# Patient Record
Sex: FEMALE | Race: ASIAN | NOT HISPANIC OR LATINO | URBAN - METROPOLITAN AREA
[De-identification: names, ages, dates, MRNs, and addresses within clinical notes are randomized per-mention and may not be internally consistent; named-entity substitution may affect disease eponyms.]

---

## 2021-03-30 ENCOUNTER — EMERGENCY (EMERGENCY)
Facility: HOSPITAL | Age: 46
LOS: 1 days | Discharge: HOME | End: 2021-03-30
Attending: EMERGENCY MEDICINE
Payer: MEDICAID

## 2021-03-30 VITALS
OXYGEN SATURATION: 98 % | HEART RATE: 70 BPM | DIASTOLIC BLOOD PRESSURE: 80 MMHG | SYSTOLIC BLOOD PRESSURE: 104 MMHG | TEMPERATURE: 98 F | RESPIRATION RATE: 20 BRPM

## 2021-03-30 VITALS
DIASTOLIC BLOOD PRESSURE: 71 MMHG | HEIGHT: 67 IN | WEIGHT: 149.91 LBS | SYSTOLIC BLOOD PRESSURE: 113 MMHG | TEMPERATURE: 98 F | HEART RATE: 81 BPM | RESPIRATION RATE: 20 BRPM

## 2021-03-30 LAB
ALBUMIN SERPL ELPH-MCNC: 4.4 G/DL — SIGNIFICANT CHANGE UP (ref 3.3–5)
ALBUMIN SERPL ELPH-MCNC: 4.8 G/DL — SIGNIFICANT CHANGE UP (ref 3.3–5)
ALP SERPL-CCNC: 39 U/L — LOW (ref 40–120)
ALP SERPL-CCNC: 44 U/L — SIGNIFICANT CHANGE UP (ref 40–120)
ALT FLD-CCNC: 12 U/L — SIGNIFICANT CHANGE UP (ref 10–45)
ALT FLD-CCNC: 14 U/L — SIGNIFICANT CHANGE UP (ref 10–45)
ANION GAP SERPL CALC-SCNC: 11 MMOL/L — SIGNIFICANT CHANGE UP (ref 5–17)
ANION GAP SERPL CALC-SCNC: 19 MMOL/L — HIGH (ref 5–17)
APPEARANCE UR: CLEAR — SIGNIFICANT CHANGE UP
APTT BLD: 26.7 SEC — LOW (ref 27.5–35.5)
AST SERPL-CCNC: 16 U/L — SIGNIFICANT CHANGE UP (ref 10–40)
AST SERPL-CCNC: 20 U/L — SIGNIFICANT CHANGE UP (ref 10–40)
BACTERIA # UR AUTO: NEGATIVE — SIGNIFICANT CHANGE UP
BASE EXCESS BLDV CALC-SCNC: -9.3 MMOL/L — LOW (ref -2–2)
BASOPHILS # BLD AUTO: 0.03 K/UL — SIGNIFICANT CHANGE UP (ref 0–0.2)
BASOPHILS NFR BLD AUTO: 0.8 % — SIGNIFICANT CHANGE UP (ref 0–2)
BILIRUB SERPL-MCNC: 0.3 MG/DL — SIGNIFICANT CHANGE UP (ref 0.2–1.2)
BILIRUB SERPL-MCNC: 0.4 MG/DL — SIGNIFICANT CHANGE UP (ref 0.2–1.2)
BILIRUB UR-MCNC: NEGATIVE — SIGNIFICANT CHANGE UP
BUN SERPL-MCNC: 5 MG/DL — LOW (ref 7–23)
BUN SERPL-MCNC: 5 MG/DL — LOW (ref 7–23)
CA-I SERPL-SCNC: 1.18 MMOL/L — SIGNIFICANT CHANGE UP (ref 1.12–1.3)
CALCIUM SERPL-MCNC: 10 MG/DL — SIGNIFICANT CHANGE UP (ref 8.4–10.5)
CALCIUM SERPL-MCNC: 9.2 MG/DL — SIGNIFICANT CHANGE UP (ref 8.4–10.5)
CHLORIDE BLDV-SCNC: 107 MMOL/L — SIGNIFICANT CHANGE UP (ref 96–108)
CHLORIDE SERPL-SCNC: 105 MMOL/L — SIGNIFICANT CHANGE UP (ref 96–108)
CHLORIDE SERPL-SCNC: 107 MMOL/L — SIGNIFICANT CHANGE UP (ref 96–108)
CO2 BLDV-SCNC: 18 MMOL/L — LOW (ref 22–30)
CO2 SERPL-SCNC: 17 MMOL/L — LOW (ref 22–31)
CO2 SERPL-SCNC: 20 MMOL/L — LOW (ref 22–31)
COLOR SPEC: COLORLESS — SIGNIFICANT CHANGE UP
CREAT SERPL-MCNC: 0.54 MG/DL — SIGNIFICANT CHANGE UP (ref 0.5–1.3)
CREAT SERPL-MCNC: 0.6 MG/DL — SIGNIFICANT CHANGE UP (ref 0.5–1.3)
DIFF PNL FLD: NEGATIVE — SIGNIFICANT CHANGE UP
EOSINOPHIL # BLD AUTO: 0.08 K/UL — SIGNIFICANT CHANGE UP (ref 0–0.5)
EOSINOPHIL NFR BLD AUTO: 2.2 % — SIGNIFICANT CHANGE UP (ref 0–6)
EPI CELLS # UR: 5 /HPF — SIGNIFICANT CHANGE UP
GAS PNL BLDV: 135 MMOL/L — SIGNIFICANT CHANGE UP (ref 135–145)
GAS PNL BLDV: SIGNIFICANT CHANGE UP
GLUCOSE BLDV-MCNC: 101 MG/DL — HIGH (ref 70–99)
GLUCOSE SERPL-MCNC: 102 MG/DL — HIGH (ref 70–99)
GLUCOSE SERPL-MCNC: 104 MG/DL — HIGH (ref 70–99)
GLUCOSE UR QL: NEGATIVE — SIGNIFICANT CHANGE UP
HCO3 BLDV-SCNC: 17 MMOL/L — LOW (ref 21–29)
HCT VFR BLD CALC: 30.2 % — LOW (ref 34.5–45)
HCT VFR BLDA CALC: 30 % — LOW (ref 39–50)
HGB BLD CALC-MCNC: 9.6 G/DL — LOW (ref 11.5–15.5)
HGB BLD-MCNC: 9.1 G/DL — LOW (ref 11.5–15.5)
HYALINE CASTS # UR AUTO: 1 /LPF — SIGNIFICANT CHANGE UP (ref 0–2)
IMM GRANULOCYTES NFR BLD AUTO: 0.3 % — SIGNIFICANT CHANGE UP (ref 0–1.5)
INR BLD: 1.13 RATIO — SIGNIFICANT CHANGE UP (ref 0.88–1.16)
KETONES UR-MCNC: SIGNIFICANT CHANGE UP
LACTATE BLDV-MCNC: 1 MMOL/L — SIGNIFICANT CHANGE UP (ref 0.7–2)
LEUKOCYTE ESTERASE UR-ACNC: NEGATIVE — SIGNIFICANT CHANGE UP
LYMPHOCYTES # BLD AUTO: 1.37 K/UL — SIGNIFICANT CHANGE UP (ref 1–3.3)
LYMPHOCYTES # BLD AUTO: 36.8 % — SIGNIFICANT CHANGE UP (ref 13–44)
MCHC RBC-ENTMCNC: 25.1 PG — LOW (ref 27–34)
MCHC RBC-ENTMCNC: 30.1 GM/DL — LOW (ref 32–36)
MCV RBC AUTO: 83.4 FL — SIGNIFICANT CHANGE UP (ref 80–100)
MONOCYTES # BLD AUTO: 0.34 K/UL — SIGNIFICANT CHANGE UP (ref 0–0.9)
MONOCYTES NFR BLD AUTO: 9.1 % — SIGNIFICANT CHANGE UP (ref 2–14)
NEUTROPHILS # BLD AUTO: 1.89 K/UL — SIGNIFICANT CHANGE UP (ref 1.8–7.4)
NEUTROPHILS NFR BLD AUTO: 50.8 % — SIGNIFICANT CHANGE UP (ref 43–77)
NITRITE UR-MCNC: NEGATIVE — SIGNIFICANT CHANGE UP
NRBC # BLD: 0 /100 WBCS — SIGNIFICANT CHANGE UP (ref 0–0)
OTHER CELLS CSF MANUAL: 9 ML/DL — LOW (ref 18–22)
PCO2 BLDV: 39 MMHG — SIGNIFICANT CHANGE UP (ref 39–42)
PH BLDV: 7.26 — LOW (ref 7.35–7.45)
PH UR: 6.5 — SIGNIFICANT CHANGE UP (ref 5–8)
PLATELET # BLD AUTO: 239 K/UL — SIGNIFICANT CHANGE UP (ref 150–400)
PO2 BLDV: 39 MMHG — SIGNIFICANT CHANGE UP (ref 25–45)
POTASSIUM BLDV-SCNC: 3.8 MMOL/L — SIGNIFICANT CHANGE UP (ref 3.5–5.3)
POTASSIUM SERPL-MCNC: 4 MMOL/L — SIGNIFICANT CHANGE UP (ref 3.5–5.3)
POTASSIUM SERPL-MCNC: 4.7 MMOL/L — SIGNIFICANT CHANGE UP (ref 3.5–5.3)
POTASSIUM SERPL-SCNC: 4 MMOL/L — SIGNIFICANT CHANGE UP (ref 3.5–5.3)
POTASSIUM SERPL-SCNC: 4.7 MMOL/L — SIGNIFICANT CHANGE UP (ref 3.5–5.3)
PROT SERPL-MCNC: 7.2 G/DL — SIGNIFICANT CHANGE UP (ref 6–8.3)
PROT SERPL-MCNC: 7.9 G/DL — SIGNIFICANT CHANGE UP (ref 6–8.3)
PROT UR-MCNC: NEGATIVE — SIGNIFICANT CHANGE UP
PROTHROM AB SERPL-ACNC: 13.5 SEC — SIGNIFICANT CHANGE UP (ref 10.6–13.6)
RBC # BLD: 3.62 M/UL — LOW (ref 3.8–5.2)
RBC # FLD: 14.3 % — SIGNIFICANT CHANGE UP (ref 10.3–14.5)
RBC CASTS # UR COMP ASSIST: 3 /HPF — SIGNIFICANT CHANGE UP (ref 0–4)
SAO2 % BLDV: 68 % — SIGNIFICANT CHANGE UP (ref 67–88)
SODIUM SERPL-SCNC: 136 MMOL/L — SIGNIFICANT CHANGE UP (ref 135–145)
SODIUM SERPL-SCNC: 143 MMOL/L — SIGNIFICANT CHANGE UP (ref 135–145)
SP GR SPEC: 1.01 — LOW (ref 1.01–1.02)
UROBILINOGEN FLD QL: NEGATIVE — SIGNIFICANT CHANGE UP
WBC # BLD: 3.72 K/UL — LOW (ref 3.8–10.5)
WBC # FLD AUTO: 3.72 K/UL — LOW (ref 3.8–10.5)
WBC UR QL: 1 /HPF — SIGNIFICANT CHANGE UP (ref 0–5)

## 2021-03-30 PROCEDURE — 82330 ASSAY OF CALCIUM: CPT

## 2021-03-30 PROCEDURE — 96360 HYDRATION IV INFUSION INIT: CPT

## 2021-03-30 PROCEDURE — 84295 ASSAY OF SERUM SODIUM: CPT

## 2021-03-30 PROCEDURE — 82947 ASSAY GLUCOSE BLOOD QUANT: CPT

## 2021-03-30 PROCEDURE — 85610 PROTHROMBIN TIME: CPT

## 2021-03-30 PROCEDURE — 80053 COMPREHEN METABOLIC PANEL: CPT

## 2021-03-30 PROCEDURE — 93005 ELECTROCARDIOGRAM TRACING: CPT

## 2021-03-30 PROCEDURE — 85018 HEMOGLOBIN: CPT

## 2021-03-30 PROCEDURE — 82435 ASSAY OF BLOOD CHLORIDE: CPT

## 2021-03-30 PROCEDURE — 82962 GLUCOSE BLOOD TEST: CPT

## 2021-03-30 PROCEDURE — 84132 ASSAY OF SERUM POTASSIUM: CPT

## 2021-03-30 PROCEDURE — 83605 ASSAY OF LACTIC ACID: CPT

## 2021-03-30 PROCEDURE — 82803 BLOOD GASES ANY COMBINATION: CPT

## 2021-03-30 PROCEDURE — 99285 EMERGENCY DEPT VISIT HI MDM: CPT | Mod: 25

## 2021-03-30 PROCEDURE — 85014 HEMATOCRIT: CPT

## 2021-03-30 PROCEDURE — 85730 THROMBOPLASTIN TIME PARTIAL: CPT

## 2021-03-30 PROCEDURE — 93010 ELECTROCARDIOGRAM REPORT: CPT

## 2021-03-30 PROCEDURE — 81001 URINALYSIS AUTO W/SCOPE: CPT

## 2021-03-30 PROCEDURE — 82010 KETONE BODYS QUAN: CPT

## 2021-03-30 PROCEDURE — 85025 COMPLETE CBC W/AUTO DIFF WBC: CPT

## 2021-03-30 PROCEDURE — 99284 EMERGENCY DEPT VISIT MOD MDM: CPT | Mod: 25

## 2021-03-30 RX ORDER — IBUPROFEN 200 MG
600 TABLET ORAL ONCE
Refills: 0 | Status: COMPLETED | OUTPATIENT
Start: 2021-03-30 | End: 2021-03-30

## 2021-03-30 RX ORDER — LIDOCAINE 4 G/100G
1 CREAM TOPICAL ONCE
Refills: 0 | Status: COMPLETED | OUTPATIENT
Start: 2021-03-30 | End: 2021-03-30

## 2021-03-30 RX ORDER — ACETAMINOPHEN 500 MG
975 TABLET ORAL ONCE
Refills: 0 | Status: COMPLETED | OUTPATIENT
Start: 2021-03-30 | End: 2021-03-30

## 2021-03-30 RX ORDER — SODIUM CHLORIDE 9 MG/ML
1000 INJECTION INTRAMUSCULAR; INTRAVENOUS; SUBCUTANEOUS ONCE
Refills: 0 | Status: COMPLETED | OUTPATIENT
Start: 2021-03-30 | End: 2021-03-30

## 2021-03-30 RX ADMIN — Medication 975 MILLIGRAM(S): at 13:35

## 2021-03-30 RX ADMIN — Medication 600 MILLIGRAM(S): at 14:30

## 2021-03-30 RX ADMIN — Medication 600 MILLIGRAM(S): at 13:35

## 2021-03-30 RX ADMIN — LIDOCAINE 1 APPLICATION(S): 4 CREAM TOPICAL at 08:20

## 2021-03-30 RX ADMIN — Medication 975 MILLIGRAM(S): at 14:30

## 2021-03-30 RX ADMIN — SODIUM CHLORIDE 1000 MILLILITER(S): 9 INJECTION INTRAMUSCULAR; INTRAVENOUS; SUBCUTANEOUS at 11:59

## 2021-03-30 RX ADMIN — SODIUM CHLORIDE 1000 MILLILITER(S): 9 INJECTION INTRAMUSCULAR; INTRAVENOUS; SUBCUTANEOUS at 13:35

## 2021-03-30 NOTE — ED ADULT NURSE NOTE - OBJECTIVE STATEMENT
44 y/o female c/o rectal pain and bleeding with BM x 1 month.  Pt states that she is usually constipated with pain and she noticed small amount of blood only with BM.   Pt states her last BM was yesterday, but prior to that it was 6 days ago.   Pt reports taking senna tea to help with BM.  Pt has not seen a doctor for this.  Pt denies fever, chills, chest pain, SOB, n/v/d, lightheadedness.  Rectal exam done by Dr. Vargas,   (please refer to MD's notes).  No acute respiratory distress noted.  Respiration easy and unlabored.  Extremities mobile. 46 y/o female c/o rectal pain and bleeding with BM x 1 month.  Pt states that she is usually constipated with pain and she noticed small amount of blood only with BM.   Pt states her last BM was yesterday, but prior to that it was 6 days ago.   Pt reports taking senna tea to help with BM.  Pt has not seen a doctor for this.  Pt denies fever, chills, chest pain, SOB, n/v/d, lightheadedness.  Rectal exam done by Dr. Vargas,  (please refer to MD's notes).  No acute respiratory distress noted.  Respiration easy and unlabored.  Extremities mobile.

## 2021-03-30 NOTE — ED PROVIDER NOTE - PHYSICAL EXAMINATION
GENERAL: Patient awake alert NAD.  HEENT: NC/AT.  LUNGS: CTAB, no wheezes or crackles.  CARDIAC: RRR, no m/r/g.  ABDOMEN: Soft, NT, ND, No rebound, guarding. No CVA tenderness.  : Rectal exham performed w/ Rozina Rakha, RN.  No external or internal hemorrhoid visualized/palpated, soft stool, significant tenderness during rectal exam. No blood.  MSK: Noo pain with movement, no deformities.  NEURO: A&Ox3. Moving all extremities.  SKIN: Warm and dry. No rash.  PSYCH: Normal affect.

## 2021-03-30 NOTE — ED PROVIDER NOTE - NSFOLLOWUPINSTRUCTIONS_ED_ALL_ED_FT
You were seen for rectal pain and bleeding.  This is likely due to either an internal hemorrhoid or anal fissure.    Do sitz baths 2-3 times a day for this.  I am also attaching a GI and surgery information for you.  Please call to make an appointment and follow up.    If you have any concerning symptoms, seek immediate medical attention. You were seen for rectal pain and bleeding.  This is likely due to either an internal hemorrhoid or anal fissure.    Do sitz baths 2-3 times a day for this.  I am also attaching a GI and surgery information for you.  Please call to make an appointment and follow up.    Follow up with your PCP within one week and bring a copy of your results.    If you have any concerning symptoms, seek immediate medical attention.

## 2021-03-30 NOTE — ED PROVIDER NOTE - PATIENT PORTAL LINK FT
You can access the FollowMyHealth Patient Portal offered by [facility] by registering at the following website: [email or web address]. By joining [employer]’s FollowMyHealth portal, you will also be able to view your health information using other applications (apps) compatible with our system. You can access the FollowMyHealth Patient Portal offered by [facility] by registering at the following website: [email or web address]. By joining [employer]’s FollowMyHealth portal, you will also be able to view your health information using other applications (apps) compatible with our system.

## 2021-03-30 NOTE — ED ADULT NURSE NOTE - CAS EDP DISCH TYPE
Pt discharged by break coverage nurse Augustine Bahena/Brijesh Pt discharged by  nurse Augustine Bahena/Brijesh

## 2021-03-30 NOTE — ED ADULT NURSE REASSESSMENT NOTE - NS ED NURSE REASSESS COMMENT FT1
Note for 11:30am:  Pt was for discharge home as per MD.  Pt was in the hallway being discharged, pt felt dizzy with LOC and was falling toward the floor but was caught by one of the techs.  Pt did not fall on the floor, witnessed by ousmane, RN. MD.   Pt. was pale and diaphoretic.  Blood glucose 99.  Dr Vargas, examined pt and spoke to pt's son on the phone who states pt. synopsizes after seeing blood.  Pt. states she felt strange when the IV was being pulled out but denies seeing any blood.  Pt states she feels better now.  No acute respiratory distress noted, v/s obtained.  20guage IV lock was inserted to rt arm.  IV fluids given as per MD.

## 2021-03-30 NOTE — ED PROVIDER NOTE - NS ED ROS FT
General: denies fever, chills, weight loss/weight gain.  HENT: denies nasal congestion, sore throat, rhinorrhea, ear pain.  Eyes: denies visual changes, blurred vision, eye discharge, eye redness.  Neck: denies neck pain, neck swelling.  CV: denies chest pain, palpitations.  Resp: denies difficulty breathing, cough.  Abdominal: denies nausea, vomiting, diarrhea, abdominal pain, blood in stool, dark stool.  : rectal pain and bleeding.  MSK: denies muscle aches, bony pain, leg pain, leg swelling  Neuro: denies headaches, numbness, tingling, dizziness, lightheadedness.  Skin: denies rashes, cuts, bruises.  Hematologic: denies unexplained bruises.

## 2021-03-30 NOTE — ED PROVIDER NOTE - OBJECTIVE STATEMENT
45F w/ no PMHx p/w rectal bleeding and pain w/ BM for one month.  States she's usually constipated and notices pain and small amounts of bleed only when having a BM. 45F w/ no PMHx p/w rectal bleeding and pain w/ BM for one month.  States she's usually constipated and notices pain and small amounts of bleed only when having a BM.  Last BM yesterday, but prior to that was 6 days ago.  Has been taking senna tea to aid w/ BM.  Denies any lightheadedness, CP, SOB.  Has not tried taking any meds or seen a doctor for this.

## 2021-03-30 NOTE — ED PROVIDER NOTE - NSFOLLOWUPCLINICS_GEN_ALL_ED_FT
[facility]  General Surgery  [address] [facility] [address] [facility] - [address] [place] [address]  Phone: [phone]  Fax:   Follow Up Time: 7-10 Days    Gastroenterology at [facility]  Gastroenterology  [address] [place] [address]  Phone: [phone]  Fax:   Follow Up Time: 7-10 Days    [facility] at [place]  Gastroenterology  [address] [place] [address]  Phone: [phone]  Fax:   Follow Up Time: 7-10 Days

## 2021-03-30 NOTE — ED PROVIDER NOTE - PROGRESS NOTE DETAILS
h/h - 9/30 - will need close interval fu with gi and urgent colonoscopy Sam PGY2 - discussed results w/ pt, who understands them.  Will dc w/ surgery and GI f/u.  Will advise to do sitz baths.  All other questions and concerns have been addressed.  Pt. will be dc/d. Sam PGY2 - Pt. was in the hallway while being discharged and felt dizzy.  Had an episode of LOC and fell toward the floor but was caught by one of the techs.  FS 99, VSS, no hypotension.  Pt. was pale and diaphoretic.  No FNDs, neuro exam unremarkable.  Spoke w/ son on the phone who states pt. syncopizes after seeing blood.  Pt. states she felt strange when the IV was being pulled out but denies seeing any blood.  States she feels better now.  Will obtain CMP, coags, EKG, administer fluids, place on monitor.  Will reassess. Sam PGY2 - pH 7.26, bicarb 17, AG 19, .  Pt. does not have a hx of DM and is not on SGLT2 inhibitors.  Will repeat CMP and add on beta hydroxy butyrate.  Will obtain UA as well to assess for urinary ketones. Sam PGY2 - Called lab x2 an hour ago and just now.  First told me beta hydroxy would result in 20 mins and now states it will result in another 10 mins. Sam PGY2 - Pt. feels improved.  CMP improved.  Labs do not show evidence of DKA.  Will dc w/ PCP f/u and strict return precautions.  All other questions and concerns have been addressed.  Pt. will be dc/d.

## 2021-03-30 NOTE — ED PROVIDER NOTE - CLINICAL SUMMARY MEDICAL DECISION MAKING FREE TEXT BOX
bam - 45 f with ho constipation - with 1 month of rectal bleeding on tp with bm only - no abd pain interittently days w/o stooling -- co rectal pain abd soft nt - no prev colonoscopy - on resident exam no internal hemorrhoid palpated no mass soft stool, on ext exam no fissure no ext hemmhorroid -- check - h/h pt hemodynamically stable if hgb nml need gi fu for outpt colonoscopy - consider viscous lidocaine for rectal pain bam - 45 f with ho constipation - with 1 month of rectal bleeding on tp with bm only - no abd pain intermittently days w/o stooling -- co rectal pain abd soft nt - no prev colonoscopy - on resident exam no internal hemorrhoid palpated no mass soft stool, on ext exam no fissure no ext hemorrhoid -- check - h/h pt hemodynamically stable if hgb nml need gi fu for outpt colonoscopy - consider viscous lidocaine for rectal pain

## 2021-03-30 NOTE — ED ADULT NURSE NOTE - NSIMPLEMENTINTERV_GEN_ALL_ED
Implemented All Universal Safety Interventions:  [place] to call system. Call bell, personal items and telephone within reach. Instruct patient to call for assistance. Room bathroom lighting operational. Non-slip footwear when patient is off stretcher. Physically safe environment: no spills, clutter or unnecessary equipment. Stretcher in lowest position, wheels locked, appropriate side rails in place.

## 2021-03-30 NOTE — ED PROVIDER NOTE - NSFOLLOWUPCLINICSTOKEN_GEN_ALL_ED_FT
126374:7-10 Days|| ||00\01||False;172304:7-10 Days|| ||00\01||False;657868:7-10 Days|| ||00\01||False;

## 2021-04-12 PROBLEM — Z78.9 OTHER SPECIFIED HEALTH STATUS: Chronic | Status: ACTIVE | Noted: 2021-03-30

## 2021-04-13 PROBLEM — Z00.00 ENCOUNTER FOR PREVENTIVE HEALTH EXAMINATION: Status: ACTIVE | Noted: 2021-04-13

## 2021-04-14 ENCOUNTER — APPOINTMENT (OUTPATIENT)
Dept: COLORECTAL SURGERY | Facility: CLINIC | Age: 46
End: 2021-04-14
Payer: MEDICAID

## 2021-04-14 VITALS
RESPIRATION RATE: 15 BRPM | BODY MASS INDEX: 23.54 KG/M2 | HEART RATE: 88 BPM | WEIGHT: 150 LBS | SYSTOLIC BLOOD PRESSURE: 92 MMHG | OXYGEN SATURATION: 98 % | DIASTOLIC BLOOD PRESSURE: 62 MMHG | HEIGHT: 67 IN | TEMPERATURE: 98.1 F

## 2021-04-14 PROCEDURE — 99244 OFF/OP CNSLTJ NEW/EST MOD 40: CPT | Mod: 25

## 2021-04-14 PROCEDURE — 46221 LIGATION OF HEMORRHOID(S): CPT

## 2021-04-14 NOTE — ASSESSMENT
[FreeTextEntry1] : Ms. Fernando presents to the office for consultation regarding bleeding and inflamed internal hemorrhoids.  She recently had a negative colonoscopy, and as such, the bleeding is thought to originate from her hemorrhoidal columns.  In office today, we discussed the pathophysiology of internal hemorrhoidal bleeding as well as flares.  She was advised on a high-fiber diet with ample water intake as well as MiraLAX nightly.  NOEL and anoscopy were also performed and a rubber band was placed to the posterior quadrant to good effect.  She and her son were advised on what to expect post procedure and she can return to the office in 2 weeks time to undergo a repeat ligation.  She had serial rubber band ligations fail to address the bleeding, she can return to the office for discussion of surgical hemorrhoidectomy.

## 2021-04-14 NOTE — HISTORY OF PRESENT ILLNESS
[FreeTextEntry1] : Ms. Fernando presents to the office, accompanied by her son for translation purposes.  She reports a history of hemorrhoidal burning as well as rectal bleeding that is chronic in nature.  She states that her bowel movements are passed on a daily basis but often with difficulty and with straining.  The rectal bleeding has been increasing in frequency over time.  There is also associated hemorrhoidal burning but no itching. Hemorrhoidal burning worsens when she uses hydrocortisone cream.  She had a colonoscopy completed approximately 1 week earlier with findings only of hemorrhoids.

## 2021-04-14 NOTE — CONSULT LETTER
[Dear  ___] : Dear  [unfilled], [Consult Letter:] : I had the pleasure of evaluating your patient, [unfilled]. [Please see my note below.] : Please see my note below. [Consult Closing:] : Thank you very much for allowing me to participate in the care of this patient.  If you have any questions, please do not hesitate to contact me. [Sincerely,] : Sincerely, [FreeTextEntry3] : Irma Yoder MD\par

## 2021-04-14 NOTE — PHYSICAL EXAM
[Normal rectal exam] : exam was normal [Reduce Spontaneously] : a spontaneously reducible (grade II) [Normal] : was normal [None] : there was no rectal mass  [No Rash or Lesion] : No rash or lesion [Alert] : alert [Oriented to Person] : oriented to person [Oriented to Place] : oriented to place [Oriented to Time] : oriented to time [Calm] : calm [Skin Tags] : there were no residual hemorrhoidal skin tags seen [Gross Blood] : no gross blood [de-identified] : No apparent distress [de-identified] : Normocephalic atraumatic [de-identified] : Moving all extremities x4

## 2021-04-20 ENCOUNTER — NON-APPOINTMENT (OUTPATIENT)
Age: 46
End: 2021-04-20

## 2021-04-20 ENCOUNTER — APPOINTMENT (OUTPATIENT)
Dept: COLORECTAL SURGERY | Facility: CLINIC | Age: 46
End: 2021-04-20
Payer: MEDICAID

## 2021-04-20 VITALS
WEIGHT: 150 LBS | BODY MASS INDEX: 23.54 KG/M2 | RESPIRATION RATE: 15 BRPM | TEMPERATURE: 98 F | HEIGHT: 67 IN | SYSTOLIC BLOOD PRESSURE: 92 MMHG | HEART RATE: 81 BPM | DIASTOLIC BLOOD PRESSURE: 65 MMHG

## 2021-04-20 PROCEDURE — 99024 POSTOP FOLLOW-UP VISIT: CPT

## 2021-04-20 NOTE — PHYSICAL EXAM
[Normal rectal exam] : exam was normal [Reduce Spontaneously] : a spontaneously reducible (grade II) [Skin Tags] : there were no residual hemorrhoidal skin tags seen [Normal] : was normal [None] : there was no rectal mass  [Gross Blood] : no gross blood [No Rash or Lesion] : No rash or lesion [Alert] : alert [Oriented to Person] : oriented to person [Oriented to Place] : oriented to place [Oriented to Time] : oriented to time [Calm] : calm [de-identified] : No apparent distress [de-identified] : Normocephalic atraumatic [de-identified] : Moving all extremities x4

## 2021-04-20 NOTE — ASSESSMENT
[FreeTextEntry1] : Ms. Fernando presents to the office for consultation regarding bleeding and inflamed internal hemorrhoids.  She recently had a negative colonoscopy, and as such, the bleeding is thought to originate from her hemorrhoidal columns.  In office today, we discussed the pathophysiology of internal hemorrhoidal bleeding as well as flares.  She was advised on a high-fiber diet with ample water intake as well as MiraLAX nightly.  NOEL and anoscopy were also performed and a rubber band was placed to the posterior quadrant to good effect.  She and her son were advised on what to expect post procedure and she can return to the office in 2 weeks time to undergo a repeat ligation.  She had serial rubber band ligations fail to address the bleeding, she can return to the office for discussion of surgical hemorrhoidectomy.\par \par 4/20/21 Ms. Fernando returns to the office with reports of anorectal pain.  On further discussion, it appears that her symptoms are arising from pelvic floor spasms.  We reviewed the components of a healthy bowel regimen, and I have suggested incorporating Metamucil into her daily routine.  In order to break the muscle spasms, I will prescribe Valium which should only be taken at night to avoid excess somnolence.  I have also advised her to use it as needed to avoid reliance on the medication for continued relief.  Sitz bath should continue to be utilized for relaxation as well.  Follow-up in 2 weeks to reassess for improvement.
yes...

## 2021-04-20 NOTE — HISTORY OF PRESENT ILLNESS
[FreeTextEntry1] : Ms. Fernando presents to the office, accompanied by her son for translation purposes.  She reports a history of hemorrhoidal burning as well as rectal bleeding that is chronic in nature.  She states that her bowel movements are passed on a daily basis but often with difficulty and with straining.  The rectal bleeding has been increasing in frequency over time.  There is also associated hemorrhoidal burning but no itching. Hemorrhoidal burning worsens when she uses hydrocortisone cream.  She had a colonoscopy completed approximately 1 week earlier with findings only of hemorrhoids.\par \par 4/20/21 Ms. Fernando presents to the office for follow-up.  She is here with reports of anorectal pain.  She states that the pain began approximately 1 month earlier, radiates posteriorly as well as anteriorly and is present with sitting and worse when ambulating.  The pain is alleviated with ibuprofen as well as warm sitz baths.  Since her last office visit, she has been compliant with MiraLAX nightly as well as daily water requirement.  She admittedly eats mostly raw fruits and vegetables and does not eat much psyllium products.  She states that there is a rectocele that also hampers her ability to defecate properly despite MiraLAX.

## 2021-05-03 ENCOUNTER — APPOINTMENT (OUTPATIENT)
Dept: COLORECTAL SURGERY | Facility: CLINIC | Age: 46
End: 2021-05-03
Payer: MEDICAID

## 2021-05-03 VITALS
BODY MASS INDEX: 23.54 KG/M2 | HEIGHT: 67 IN | DIASTOLIC BLOOD PRESSURE: 74 MMHG | RESPIRATION RATE: 16 BRPM | HEART RATE: 106 BPM | SYSTOLIC BLOOD PRESSURE: 114 MMHG | WEIGHT: 150 LBS

## 2021-05-03 PROCEDURE — 99214 OFFICE O/P EST MOD 30 MIN: CPT

## 2021-05-03 PROCEDURE — 99072 ADDL SUPL MATRL&STAF TM PHE: CPT

## 2021-05-03 RX ORDER — HYDROCORTISONE 25 MG/G
2.5 CREAM TOPICAL
Refills: 0 | Status: COMPLETED | COMMUNITY
End: 2021-05-03

## 2021-05-03 RX ORDER — IBUPROFEN 400 MG/1
400 TABLET, FILM COATED ORAL
Qty: 30 | Refills: 0 | Status: ACTIVE | COMMUNITY
Start: 2021-04-13

## 2021-05-03 NOTE — ASSESSMENT
[FreeTextEntry1] : Ms. Fernando presents to the office for consultation regarding bleeding and inflamed internal hemorrhoids.  She recently had a negative colonoscopy, and as such, the bleeding is thought to originate from her hemorrhoidal columns.  In office today, we discussed the pathophysiology of internal hemorrhoidal bleeding as well as flares.  She was advised on a high-fiber diet with ample water intake as well as MiraLAX nightly.  NOEL and anoscopy were also performed and a rubber band was placed to the posterior quadrant to good effect.  She and her son were advised on what to expect post procedure and she can return to the office in 2 weeks time to undergo a repeat ligation.  She had serial rubber band ligations fail to address the bleeding, she can return to the office for discussion of surgical hemorrhoidectomy.\par \par 4/20/21 Ms. Fernando returns to the office with reports of anorectal pain.  On further discussion, it appears that her symptoms are arising from pelvic floor spasms.  We reviewed the components of a healthy bowel regimen, and I have suggested incorporating Metamucil into her daily routine.  In order to break the muscle spasms, I will prescribe Valium which should only be taken at night to avoid excess somnolence.  I have also advised her to use it as needed to avoid reliance on the medication for continued relief.  Sitz bath should continue to be utilized for relaxation as well.  Follow-up in 2 weeks to reassess for improvement.\par \par 5/3/21 Ms. Fernando returns to the office for follow-up.  Her pelvic pain and discomfort persist despite usage of p.o. Valium.  Based on the inability of Valium to alleviate her discomfort, pelvic floor spasms then may not be the cause of her discomfort.  I have recommended a CT of the pelvis to further elucidate the cause of her pain.  She also reports a known history of ? uterine prolapse.  I recommended she see a urogynecologist in the [facility] to further evaluate this as a potential cause of her pain and discomfort.  She requested scheduling for a hemorrhoidectomy, but based on NOEL/anoscopy from her consultation visit, there is minimal disease to treat, and undergoing hemorrhoid surgery certainly would not alleviate the pain that she is currently experiencing.  In addition, she denies hemorrhoidal prolapse or bleeding, conditions which would normally allow one to benefit from a hemorrhoidectomy.  She verbalized understanding and will follow through with the CT scan.  Once results are available to me, I will notify her by telephone on next steps and plan of care.

## 2021-05-03 NOTE — HISTORY OF PRESENT ILLNESS
[FreeTextEntry1] : Ms. Fernando presents to the office, accompanied by her son for translation purposes.  She reports a history of hemorrhoidal burning as well as rectal bleeding that is chronic in nature.  She states that her bowel movements are passed on a daily basis but often with difficulty and with straining.  The rectal bleeding has been increasing in frequency over time.  There is also associated hemorrhoidal burning but no itching. Hemorrhoidal burning worsens when she uses hydrocortisone cream.  She had a colonoscopy completed approximately 1 week earlier with findings only of hemorrhoids.\par \par 4/20/21 Ms. Fernando presents to the office for follow-up.  She is here with reports of anorectal pain.  She states that the pain began approximately 1 month earlier, radiates posteriorly as well as anteriorly and is present with sitting and worse when ambulating.  The pain is alleviated with ibuprofen as well as warm sitz baths.  Since her last office visit, she has been compliant with MiraLAX nightly as well as daily water requirement.  She admittedly eats mostly raw fruits and vegetables and does not eat much psyllium products.  She states that there is a rectocele that also hampers her ability to defecate properly despite MiraLAX.\par \par 5/3/21 Ms. Fernando returns to the office for a follow-up visit.  Since her last appointment, she continues to report the same symptoms of pelvic pain and discomfort.  Nightly Valium for 5 days straight was apparently ineffective in alleviating any of her pelvic floor discomfort.

## 2021-05-03 NOTE — PHYSICAL EXAM
[Normal rectal exam] : exam was normal [Reduce Spontaneously] : a spontaneously reducible (grade II) [Normal] : was normal [None] : there was no rectal mass  [No Rash or Lesion] : No rash or lesion [Alert] : alert [Oriented to Person] : oriented to person [Oriented to Place] : oriented to place [Oriented to Time] : oriented to time [Calm] : calm [Skin Tags] : there were no residual hemorrhoidal skin tags seen [Gross Blood] : no gross blood [de-identified] : No apparent distress [de-identified] : Normocephalic atraumatic [de-identified] : Moving all extremities x4

## 2021-05-06 RX ORDER — HYDROCORTISONE 2.5% 25 MG/G
2.5 CREAM TOPICAL TWICE DAILY
Qty: 1 | Refills: 4 | Status: ACTIVE | COMMUNITY
Start: 2021-05-06 | End: 1900-01-01

## 2021-05-21 ENCOUNTER — APPOINTMENT (OUTPATIENT)
Dept: COLORECTAL SURGERY | Facility: CLINIC | Age: 46
End: 2021-05-21
Payer: MEDICAID

## 2021-05-21 DIAGNOSIS — Z78.9 OTHER SPECIFIED HEALTH STATUS: ICD-10-CM

## 2021-05-21 PROCEDURE — 99072 ADDL SUPL MATRL&STAF TM PHE: CPT

## 2021-05-21 PROCEDURE — 99214 OFFICE O/P EST MOD 30 MIN: CPT

## 2021-05-21 RX ORDER — HYDROCORTISONE ACETATE 25 MG/1
25 SUPPOSITORY RECTAL
Qty: 10 | Refills: 0 | Status: COMPLETED | COMMUNITY
Start: 2021-05-03 | End: 2021-05-21

## 2021-05-21 RX ORDER — DIAZEPAM 5 MG/1
5 TABLET ORAL
Qty: 5 | Refills: 0 | Status: COMPLETED | COMMUNITY
Start: 2021-04-20 | End: 2021-05-21

## 2021-05-21 NOTE — HISTORY OF PRESENT ILLNESS
[FreeTextEntry1] : Ms. Fernando presents to the office, accompanied by her son for translation purposes.  She reports a history of hemorrhoidal burning as well as rectal bleeding that is chronic in nature.  She states that her bowel movements are passed on a daily basis but often with difficulty and with straining.  The rectal bleeding has been increasing in frequency over time.  There is also associated hemorrhoidal burning but no itching. Hemorrhoidal burning worsens when she uses hydrocortisone cream.  She had a colonoscopy completed approximately 1 week earlier with findings only of hemorrhoids.\par \par 4/20/21 Ms. Fernando presents to the office for follow-up.  She is here with reports of anorectal pain.  She states that the pain began approximately 1 month earlier, radiates posteriorly as well as anteriorly and is present with sitting and worse when ambulating.  The pain is alleviated with ibuprofen as well as warm sitz baths.  Since her last office visit, she has been compliant with MiraLAX nightly as well as daily water requirement.  She admittedly eats mostly raw fruits and vegetables and does not eat much psyllium products.  She states that there is a rectocele that also hampers her ability to defecate properly despite MiraLAX.\par \par 5/3/21 Ms. Fernando returns to the office for a follow-up visit.  Since her last appointment, she continues to report the same symptoms of pelvic pain and discomfort.  Nightly Valium for 5 days straight was apparently ineffective in alleviating any of her pelvic floor discomfort.  \par \par 5/21/21 Ms. Fernando returns to the office for follow-up.  Since her last appointment, she contacted her primary care physician in [place] for advice and recommendations on treatment of her internal hemorrhoids.  He mailed over an assortment of medications and creams that she has been using over the last 2 weeks with near resolution of symptoms.  She is now able to sit without significant pain or discomfort and is able to eat without fear of rectal pain.  Review of these medications suggest several that are homeopathic in nature including one that is a mustard oil.

## 2021-05-21 NOTE — ASSESSMENT
[FreeTextEntry1] : Ms. Fernando presents to the office for consultation regarding bleeding and inflamed internal hemorrhoids.  She recently had a negative colonoscopy, and as such, the bleeding is thought to originate from her hemorrhoidal columns.  In office today, we discussed the pathophysiology of internal hemorrhoidal bleeding as well as flares.  She was advised on a high-fiber diet with ample water intake as well as MiraLAX nightly.  NOEL and anoscopy were also performed and a rubber band was placed to the posterior quadrant to good effect.  She and her son were advised on what to expect post procedure and she can return to the office in 2 weeks time to undergo a repeat ligation.  She had serial rubber band ligations fail to address the bleeding, she can return to the office for discussion of surgical hemorrhoidectomy.\par \par 4/20/21 Ms. Fernando returns to the office with reports of anorectal pain.  On further discussion, it appears that her symptoms are arising from pelvic floor spasms.  We reviewed the components of a healthy bowel regimen, and I have suggested incorporating Metamucil into her daily routine.  In order to break the muscle spasms, I will prescribe Valium which should only be taken at night to avoid excess somnolence.  I have also advised her to use it as needed to avoid reliance on the medication for continued relief.  Sitz bath should continue to be utilized for relaxation as well.  Follow-up in 2 weeks to reassess for improvement.\par \par 5/3/21 Ms. Fernando returns to the office for follow-up.  Her pelvic pain and discomfort persist despite usage of p.o. Valium.  Based on the inability of Valium to alleviate her discomfort, pelvic floor spasms then may not be the cause of her discomfort.  I have recommended a CT of the pelvis to further elucidate the cause of her pain.  She also reports a known history of ? uterine prolapse.  I recommended she see a urogynecologist in the [facility] to further evaluate this as a potential cause of her pain and discomfort.  She requested scheduling for a hemorrhoidectomy, but based on NOEL/anoscopy from her consultation visit, there is minimal disease to treat, and undergoing hemorrhoid surgery certainly would not alleviate the pain that she is currently experiencing.  In addition, she denies hemorrhoidal prolapse or bleeding, conditions which would normally allow one to benefit from a hemorrhoidectomy.  She verbalized understanding and will follow through with the CT scan.  Once results are available to me, I will notify her by telephone on next steps and plan of care.\par \par 5/21/21 Ms. Fernando returns to the office for follow-up.  Since her last office appointment, she is notably improved in terms of her rectal pain as she has been using medications that her PCP in [facility] has sent over for symptomatic relief.  The majority of these creams and pills appear to be homeopathic and not readily available in the [place].  Despite this, I was happy to hear of her overall clinical improvement.  Based on her anorectal exam 1 month earlier, I did not believe she needed surgical intervention to alleviate any hemorrhoidal pain and discomfort, however, her family strongly encourages her is her to pursue surgical treatment.  Should she feel as if surgery is necessary to prevent additional episodes of hemorrhoidal pain and discomfort, I would be happy to perform this, however, she was advised that hemorrhoid surgery in general is very painful at least for the first 2 weeks.  I certainly would not offer surgery at this junction since she only recently began to feel improved.  I have recommended that she return to the office in 4 to 6 weeks time for us to once again discuss potential surgery.  She is agreeable with this plan of care.

## 2021-05-21 NOTE — PHYSICAL EXAM
[Normal rectal exam] : exam was normal [Reduce Spontaneously] : a spontaneously reducible (grade II) [Normal] : was normal [None] : there was no rectal mass  [No Rash or Lesion] : No rash or lesion [Alert] : alert [Oriented to Person] : oriented to person [Oriented to Place] : oriented to place [Oriented to Time] : oriented to time [Calm] : calm [Skin Tags] : there were no residual hemorrhoidal skin tags seen [Gross Blood] : no gross blood [de-identified] : No apparent distress [de-identified] : Moving all extremities x4 [de-identified] : Normocephalic atraumatic

## 2021-06-24 ENCOUNTER — APPOINTMENT (OUTPATIENT)
Dept: COLORECTAL SURGERY | Facility: CLINIC | Age: 46
End: 2021-06-24

## 2021-07-28 ENCOUNTER — APPOINTMENT (OUTPATIENT)
Dept: UROGYNECOLOGY | Facility: CLINIC | Age: 46
End: 2021-07-28
Payer: MEDICAID

## 2021-07-28 ENCOUNTER — RESULT CHARGE (OUTPATIENT)
Age: 46
End: 2021-07-28

## 2021-07-28 VITALS
BODY MASS INDEX: 23.7 KG/M2 | WEIGHT: 151 LBS | SYSTOLIC BLOOD PRESSURE: 113 MMHG | HEIGHT: 67 IN | DIASTOLIC BLOOD PRESSURE: 68 MMHG

## 2021-07-28 DIAGNOSIS — R31.9 HEMATURIA, UNSPECIFIED: ICD-10-CM

## 2021-07-28 DIAGNOSIS — Z86.018 PERSONAL HISTORY OF OTHER BENIGN NEOPLASM: ICD-10-CM

## 2021-07-28 LAB
BILIRUB UR QL STRIP: NEGATIVE
CLARITY UR: CLEAR
COLLECTION METHOD: NORMAL
GLUCOSE UR-MCNC: NEGATIVE
HCG UR QL: 0.2 EU/DL
HGB UR QL STRIP.AUTO: NEGATIVE
KETONES UR-MCNC: NEGATIVE
LEUKOCYTE ESTERASE UR QL STRIP: NEGATIVE
NITRITE UR QL STRIP: NEGATIVE
PH UR STRIP: 5.5
PROT UR STRIP-MCNC: NEGATIVE
SP GR UR STRIP: 1

## 2021-07-28 PROCEDURE — 51701 INSERT BLADDER CATHETER: CPT

## 2021-07-28 PROCEDURE — 99204 OFFICE O/P NEW MOD 45 MIN: CPT | Mod: 25

## 2021-07-28 NOTE — PROCEDURE
[Straight Catheterization] : insertion of a straight catheter [Urinary Retention] : urinary retention [Urinary Frequency] : urinary frequency [Patient] : the patient [___ Fr Straight Tip] : a [unfilled] in [place] straight tip catheter [None] : there were no complications with the catheter insertion [Clear] : clear [No Complications] : no complications [Tolerated Well] : the patient tolerated the procedure well [Post procedure instructions and information given] : Post procedure instructions and information were given and reviewed with patient. [1] : 1 [FreeTextEntry1] : cathed to obtain pvr and uncontam specimen

## 2021-07-28 NOTE — HISTORY OF PRESENT ILLNESS
[FreeTextEntry1] : About 5 years bothersome vaginal bulge which she splints to pass bowel movements. Not sexually active for years but not due to this issue. Incomplete evacuation of bowel movements as well. Recently chronic constipation better managed with herbal and OTC remedies. No blood in stool. Premenopausal without heavy bleeding or pelvic pain. No urinary incontinence, no incomplete bladder emptying, no dysuria or UTIs, no flank pain, no gross hematuria. Daytime urgency and freq with nocturia 1 dependent on PO intake. \par \par  used in [facility] #[id number].\par \par PSH includes LSC (? side) salpingectomy for ectopic pregnancy, and PP BTL?\par NKDA

## 2021-07-28 NOTE — PHYSICAL EXAM
[Chaperone Present] : A chaperone was present in the examining room during all aspects of the physical examination [No Acute Distress] : in no acute distress [Oriented x3] : oriented to person, place, and time [No Edema] : ~T edema was not present [Symmetrical] : the neck was ~L symmetrical [None] : no CVA tenderness [Warm and Dry] : was warm and dry to touch [Normal Gait] : gait was normal [Labia Majora] : were normal [Labia Minora] : were normal [Bartholin's Gland] : both Bartholin's glands were normal  [Normal Appearance] : general appearance was normal [No Bleeding] : there was no active vaginal bleeding [2] : 2 [Aa ____] : Aa [unfilled] [Ba ____] : Ba [unfilled] [C ____] : C [unfilled] [GH ____] : GH [unfilled] [PB ____] : PB [unfilled] [TVL ____] : TVL  [unfilled] [Ap ____] : Ap [unfilled] [Bp ____] : Bp [unfilled] [D ____] : D [unfilled] [] : I [Normal] : normal [Soft] :  the cervix was soft [Post Void Residual ____ml] : post void residual was [unfilled] ml [Exam Deferred] : was deferred [Cough] : no cough [Tenderness] : ~T no ~M abdominal tenderness observed [Distended] : not distended [Performed?] : was not performed [Inguinal LAD] : no adenopathy was noted in the inguinal lymph nodes [FreeTextEntry3] : empty supine cst neg, no urethral hypermobility [FreeTextEntry4] : no mass lesion or cyst [de-identified] : standing exam done as well [de-identified] : no appreciable mass or tenderness

## 2021-07-28 NOTE — OB HISTORY
[Vaginal ___] : [unfilled] vaginal delivery(s) [Sexually Active] : is not sexually active [FreeTextEntry1] : 2 miscarriages

## 2021-07-28 NOTE — ASSESSMENT
[FreeTextEntry1] : Shruthi is a pleasant 47 yo premenopausal P3, PSHx includes BTL and unilateral salpingectomy for ectopic pregnancy, presents with OAB-dry and a rectocele. On exam, her empty supine CST was neg and she did not have positive urethral hypermobility. Her straight-cath PVR volume was 100 ml clear and the dip was neg. On pelvic exam, she had a positive bulbo reflex. There were no appreciable masses, cysts, or lesions. Pelvic floor muscle contraction strength was present but weak. There was no levator or pelvic floor musculature banding, tightness, or tenderness. POPQ exam demonstrated a mild stage II rectocele with mostly distal component. On exam, she palpated this region and states this is where she splints.\par \par The patient has pelvic organ prolapse. Management options including observation, kegels with or without PT, biofeedback, pessary, and surgery were reviewed. Pessary care was reviewed. Surg options obliterative vs reconstructive, major vs minor, abd / robotic vs vaginal, with or without hysterectomy, with or without graft use discussed. We discussed with lack of apical descent, posterior repair would be the surgical option - transvaginal minor repair. Risk of future apical prolapse discussed. We also discussed OAB and options including observation, behavioral modifications (dietary changes, monitoring fluid intake, bladder training, timed voids, use of pads/protective garments), kegels, PT, medications, PTNS, SNS, and bladder Botox were all reviewed. She will observe the OAB as she feels she has control and urgency freq is mainly dependent on amount of PO intake and appropriate. She understood options. All ques answered. She believes she would like to have CRS procedure done per Dr. Yoder for her hemarrhoids, and so inquired if the posterior repair could be done concurrently which I informed her it can. She will see Dr. Yoder, and RTO for surgical preop consent visit prn.\par \par \par

## 2021-07-28 NOTE — REASON FOR VISIT
[Questionnaire Received] : Patient questionnaire received [Pelvic Organ Prolapse] : pelvic organ prolapse [Urine Frequency] : urine frequency [Urinary Urgency] : urinary urgency [Nocturia] : nocturia [Poor/Slow Urine Flow] : poor/slow urine flow

## 2021-07-30 ENCOUNTER — APPOINTMENT (OUTPATIENT)
Dept: COLORECTAL SURGERY | Facility: CLINIC | Age: 46
End: 2021-07-30
Payer: COMMERCIAL

## 2021-07-30 VITALS
SYSTOLIC BLOOD PRESSURE: 103 MMHG | BODY MASS INDEX: 24.33 KG/M2 | DIASTOLIC BLOOD PRESSURE: 62 MMHG | HEART RATE: 78 BPM | RESPIRATION RATE: 14 BRPM | WEIGHT: 155 LBS | HEIGHT: 67 IN | TEMPERATURE: 97.9 F | OXYGEN SATURATION: 98 %

## 2021-07-30 DIAGNOSIS — K64.8 OTHER HEMORRHOIDS: ICD-10-CM

## 2021-07-30 PROCEDURE — 99214 OFFICE O/P EST MOD 30 MIN: CPT

## 2021-07-30 NOTE — HISTORY OF PRESENT ILLNESS
[FreeTextEntry1] : Ms. Fernando presents to the office, accompanied by her son for translation purposes.  She reports a history of hemorrhoidal burning as well as rectal bleeding that is chronic in nature.  She states that her bowel movements are passed on a daily basis but often with difficulty and with straining.  The rectal bleeding has been increasing in frequency over time.  There is also associated hemorrhoidal burning but no itching. Hemorrhoidal burning worsens when she uses hydrocortisone cream.  She had a colonoscopy completed approximately 1 week earlier with findings only of hemorrhoids.\par \par 4/20/21 Ms. Fernando presents to the office for follow-up.  She is here with reports of anorectal pain.  She states that the pain began approximately 1 month earlier, radiates posteriorly as well as anteriorly and is present with sitting and worse when ambulating.  The pain is alleviated with ibuprofen as well as warm sitz baths.  Since her last office visit, she has been compliant with MiraLAX nightly as well as daily water requirement.  She admittedly eats mostly raw fruits and vegetables and does not eat much psyllium products.  She states that there is a rectocele that also hampers her ability to defecate properly despite MiraLAX.\par \par 5/3/21 Ms. Fernando returns to the office for a follow-up visit.  Since her last appointment, she continues to report the same symptoms of pelvic pain and discomfort.  Nightly Valium for 5 days straight was apparently ineffective in alleviating any of her pelvic floor discomfort.  \par \par 5/21/21 Ms. Fernando returns to the office for follow-up.  Since her last appointment, she contacted her primary care physician in [place] for advice and recommendations on treatment of her internal hemorrhoids.  He mailed over an assortment of medications and creams that she has been using over the last 2 weeks with near resolution of symptoms.  She is now able to sit without significant pain or discomfort and is able to eat without fear of rectal pain.  Review of these medications suggest several that are homeopathic in nature including one that is a mustard oil.\par \par 7/30/21 Ms. Fernando returns to the office for followup. Since her last appt, she has been seen and evaluated by Dr. Pascual and found to have a rectocele which will be repaired transvaginally. Here to discuss joint surgery with hemorrhoid ligation/removal to be done in same setting.  Patient states that the cream which she has been using is effective, but once the cream is discontinued, symptoms of pain, swelling and bleeding return.  She continues to pass stools on a daily basis.

## 2021-07-30 NOTE — PHYSICAL EXAM
[Normal rectal exam] : exam was normal [Reduce Spontaneously] : a spontaneously reducible (grade II) [Skin Tags] : there were no residual hemorrhoidal skin tags seen [Normal] : was normal [None] : there was no rectal mass  [Gross Blood] : no gross blood [No Rash or Lesion] : No rash or lesion [Alert] : alert [Oriented to Person] : oriented to person [Oriented to Place] : oriented to place [Oriented to Time] : oriented to time [Calm] : calm [de-identified] : No apparent distress [de-identified] : Normocephalic atraumatic [de-identified] : Moving all extremities x4

## 2021-07-30 NOTE — ASSESSMENT
[FreeTextEntry1] : Ms. Fernando presents to the office for consultation regarding bleeding and inflamed internal hemorrhoids.  She recently had a negative colonoscopy, and as such, the bleeding is thought to originate from her hemorrhoidal columns.  In office today, we discussed the pathophysiology of internal hemorrhoidal bleeding as well as flares.  She was advised on a high-fiber diet with ample water intake as well as MiraLAX nightly.  NOEL and anoscopy were also performed and a rubber band was placed to the posterior quadrant to good effect.  She and her son were advised on what to expect post procedure and she can return to the office in 2 weeks time to undergo a repeat ligation.  She had serial rubber band ligations fail to address the bleeding, she can return to the office for discussion of surgical hemorrhoidectomy.\par \par 4/20/21 Ms. Fernando returns to the office with reports of anorectal pain.  On further discussion, it appears that her symptoms are arising from pelvic floor spasms.  We reviewed the components of a healthy bowel regimen, and I have suggested incorporating Metamucil into her daily routine.  In order to break the muscle spasms, I will prescribe Valium which should only be taken at night to avoid excess somnolence.  I have also advised her to use it as needed to avoid reliance on the medication for continued relief.  Sitz bath should continue to be utilized for relaxation as well.  Follow-up in 2 weeks to reassess for improvement.\par \par 5/3/21 Ms. Fernando returns to the office for follow-up.  Her pelvic pain and discomfort persist despite usage of p.o. Valium.  Based on the inability of Valium to alleviate her discomfort, pelvic floor spasms then may not be the cause of her discomfort.  I have recommended a CT of the pelvis to further elucidate the cause of her pain.  She also reports a known history of ? uterine prolapse.  I recommended she see a urogynecologist in the [facility] to further evaluate this as a potential cause of her pain and discomfort.  She requested scheduling for a hemorrhoidectomy, but based on NOEL/anoscopy from her consultation visit, there is minimal disease to treat, and undergoing hemorrhoid surgery certainly would not alleviate the pain that she is currently experiencing.  In addition, she denies hemorrhoidal prolapse or bleeding, conditions which would normally allow one to benefit from a hemorrhoidectomy.  She verbalized understanding and will follow through with the CT scan.  Once results are available to me, I will notify her by telephone on next steps and plan of care.\par \par 5/21/21 Ms. Fernando returns to the office for follow-up.  Since her last office appointment, she is notably improved in terms of her rectal pain as she has been using medications that her PCP in [facility] has sent over for symptomatic relief.  The majority of these creams and pills appear to be homeopathic and not readily available in the [place].  Despite this, I was happy to hear of her overall clinical improvement.  Based on her anorectal exam 1 month earlier, I did not believe she needed surgical intervention to alleviate any hemorrhoidal pain and discomfort, however, her family strongly encourages her is her to pursue surgical treatment.  Should she feel as if surgery is necessary to prevent additional episodes of hemorrhoidal pain and discomfort, I would be happy to perform this, however, she was advised that hemorrhoid surgery in general is very painful at least for the first 2 weeks.  I certainly would not offer surgery at this junction since she only recently began to feel improved.  I have recommended that she return to the office in 4 to 6 weeks time for us to once again discuss potential surgery.  She is agreeable with this plan of care.\par \par 7/30/21 Ms. Fernando returns to the office for follow-up.  She continues to have issues with her internal hemorrhoids in the form of pain, swelling and occasional bleeding.  As she is planning to undergo rectocele repair with Dr. VILLAGRAN, we will coordinate and have her hemorrhoids addressed at the same setting.  The patient was post hemorrhoidal surgery in the form of either ligation or removal can be fairly painful for at least the first 2 weeks.  Exparel will be used at the termination of the procedure and then appropriate pain medications will be sent into the pharmacy to allow her to be comfortable during recovery.  After all questions were addressed, patient remains interested in proceeding.

## 2021-09-15 ENCOUNTER — NON-APPOINTMENT (OUTPATIENT)
Age: 46
End: 2021-09-15

## 2021-09-17 ENCOUNTER — APPOINTMENT (OUTPATIENT)
Dept: UROGYNECOLOGY | Facility: CLINIC | Age: 46
End: 2021-09-17

## 2021-09-22 ENCOUNTER — APPOINTMENT (OUTPATIENT)
Dept: UROGYNECOLOGY | Facility: CLINIC | Age: 46
End: 2021-09-22
Payer: COMMERCIAL

## 2021-09-22 DIAGNOSIS — R39.15 URGENCY OF URINATION: ICD-10-CM

## 2021-09-22 DIAGNOSIS — M62.838 OTHER MUSCLE SPASM: ICD-10-CM

## 2021-09-22 DIAGNOSIS — R35.1 NOCTURIA: ICD-10-CM

## 2021-09-22 DIAGNOSIS — M79.18 MYALGIA, OTHER SITE: ICD-10-CM

## 2021-09-22 DIAGNOSIS — R33.9 RETENTION OF URINE, UNSPECIFIED: ICD-10-CM

## 2021-09-22 DIAGNOSIS — R30.0 DYSURIA: ICD-10-CM

## 2021-09-22 DIAGNOSIS — R10.2 PELVIC AND PERINEAL PAIN: ICD-10-CM

## 2021-09-22 DIAGNOSIS — R35.0 FREQUENCY OF MICTURITION: ICD-10-CM

## 2021-09-22 DIAGNOSIS — K59.00 CONSTIPATION, UNSPECIFIED: ICD-10-CM

## 2021-09-22 PROCEDURE — 99215 OFFICE O/P EST HI 40 MIN: CPT

## 2021-09-24 NOTE — HISTORY OF PRESENT ILLNESS
[FreeTextEntry1] : Symptomatic rectocele. Not sexually active. Today, reports years of heavy menses resulting in anemia, taking iron and constipation on stool softener. Today, she reports that her bigger issue is the vaginal bleeding and is under the impression that today's surgery addresses that. Therefore, we discussed AUB, testing such as embx, TVUS to eval ut, EE, ovs, and r/o path such as hyperplasia or cancer. She is unsure if her Gyn has discussed medical or procedural and surgical intervention for treatment of AUB. Currently, no dizziness/SOB/palpitations/CP. For the rectocele, she no longer desires surg correction, and also declines nonsurg intervention such as observation, kegels with or without PT, biofeedback, pessary. No UI presently. Also with hemarrhoids managed per CRS.\par \par Speaks [place]\par PSH includes LSC (? side) salpingectomy for ectopic pregnancy, and PP BTL?\par NKDA \par BMI 24

## 2021-09-24 NOTE — ASSESSMENT
[FreeTextEntry1] : Symptomatic rectocele. Hemarrhoids. AUB desiring management. We discussed differential for AUB which is her pressing issue, obtaining records, and brief options such as medical management, procedural, or surg/definitive with hysterectomy. We discussed absence of signif uterine POP, however if she desires surg management with hyst then I can offer her this in combination with surg management of rectocele. I would need TVUS report, pap and embx results first. She reports she likely will go somewhere closer to home after discussing with Gyn, who takes her insurance. All ques answered.\par \par Plan:\par [] obtain TVUS, pap, embx reports from Gyn\par [] RTO prn, surg booking prn\par \par \par \par

## 2021-10-27 ENCOUNTER — APPOINTMENT (OUTPATIENT)
Dept: UROGYNECOLOGY | Facility: HOSPITAL | Age: 46
End: 2021-10-27

## 2021-11-05 ENCOUNTER — APPOINTMENT (OUTPATIENT)
Dept: UROGYNECOLOGY | Facility: CLINIC | Age: 46
End: 2021-11-05

## 2021-11-15 ENCOUNTER — APPOINTMENT (OUTPATIENT)
Dept: UROGYNECOLOGY | Facility: CLINIC | Age: 46
End: 2021-11-15
Payer: COMMERCIAL

## 2021-11-15 VITALS
BODY MASS INDEX: 24.33 KG/M2 | WEIGHT: 155 LBS | HEIGHT: 67 IN | SYSTOLIC BLOOD PRESSURE: 100 MMHG | DIASTOLIC BLOOD PRESSURE: 62 MMHG | TEMPERATURE: 97.7 F

## 2021-11-15 DIAGNOSIS — N93.9 ABNORMAL UTERINE AND VAGINAL BLEEDING, UNSPECIFIED: ICD-10-CM

## 2021-11-15 DIAGNOSIS — N81.6 RECTOCELE: ICD-10-CM

## 2021-11-15 PROCEDURE — 99214 OFFICE O/P EST MOD 30 MIN: CPT

## 2021-11-15 NOTE — DISCUSSION/SUMMARY
[FreeTextEntry1] : I reviewed the above findings with the patient and her son. I reviewed the above findings with the patient with visual illustrations. Treatment options for the prolapse were discussed and included doing nothing, Kegel exercises and behavioral modification, a pessary, or surgical correction.Surgically we discussed a posterior repair.  Her main concern is her abnormal uterine bleeding and have referred her over to GYN gynecological surgical clinic for evaluation for possible hysterectomy and/or treatment.  All questions were answered.

## 2021-11-15 NOTE — HISTORY OF PRESENT ILLNESS
[FreeTextEntry3] : when has BM [de-identified] : feels skin coming out of vagina when she has a BM [de-identified] : rare [FreeTextEntry1] : pt reports irregular nemeses and is interested in surgery. \par Discussed with pt that I do not manage AUB or do General GYN.

## 2021-11-15 NOTE — PHYSICAL EXAM
[Chaperone Present] : A chaperone was present in the examining room during all aspects of the physical examination [No Acute Distress] : in no acute distress [Well developed] : well developed [Well Nourished] : ~L well nourished [Soft, Nontender] : the abdomen was soft and nontender [Warm and Dry] : was warm and dry to touch [Normal Appearance] : general appearance was normal [Ap ____] : Ap [unfilled] [Bp ____] : Bp [unfilled] [Normal] : normal [Uterine Adnexae] : were not tender and not enlarged [Normal rectal exam] : was normal

## 2021-12-01 ENCOUNTER — APPOINTMENT (OUTPATIENT)
Dept: OBGYN | Facility: CLINIC | Age: 46
End: 2021-12-01
Payer: MEDICAID

## 2021-12-01 ENCOUNTER — OUTPATIENT (OUTPATIENT)
Dept: OUTPATIENT SERVICES | Facility: HOSPITAL | Age: 46
LOS: 1 days | End: 2021-12-01
Payer: COMMERCIAL

## 2021-12-01 VITALS — DIASTOLIC BLOOD PRESSURE: 62 MMHG | SYSTOLIC BLOOD PRESSURE: 100 MMHG | BODY MASS INDEX: 26.47 KG/M2 | WEIGHT: 169 LBS

## 2021-12-01 DIAGNOSIS — Z01.419 ENCOUNTER FOR GYNECOLOGICAL EXAMINATION (GENERAL) (ROUTINE) W/OUT ABNORMAL FINDINGS: ICD-10-CM

## 2021-12-01 DIAGNOSIS — N76.0 ACUTE VAGINITIS: ICD-10-CM

## 2021-12-01 PROCEDURE — 99386 PREV VISIT NEW AGE 40-64: CPT

## 2021-12-01 PROCEDURE — G0463: CPT

## 2021-12-01 NOTE — PHYSICAL EXAM
[Normal] : uterus [Labia Minora] : labia minora [No Bleeding] : there was no active vaginal bleeding [Nabothian Cyst ___ cm] : had a [unfilled] ~Ucm nabothian cyst [Pap Obtained] : a Pap smear was not performed [Motion Tenderness] : there was no cervical motion tenderness [Enlarged ___ wks] : enlarged [unfilled] ~Uweeks [Uterine Adnexae] : were not tender and not enlarged [Adnexa Tenderness] : were not tender [FreeTextEntry5] : bulky cervix on exam

## 2021-12-01 NOTE — HISTORY OF PRESENT ILLNESS
[Definite:  ___ (Date)] : the last menstrual period was [unfilled] [Normal Amount/Duration] : was abnormal [Spotting Between  Menses] : no spotting between menses [Regular Cycle Intervals] : periods have been irregular

## 2021-12-01 NOTE — REVIEW OF SYSTEMS
[Constipation] : constipation [Diarrhea] : diarrhea [Melena] : melcy [Incontinence] : incontinence [Abn Vag Bleeding] : abnormal vaginal bleeding [Frequency] : frequency [Nl] : Integumentary

## 2021-12-03 ENCOUNTER — APPOINTMENT (OUTPATIENT)
Dept: UROGYNECOLOGY | Facility: CLINIC | Age: 46
End: 2021-12-03

## 2021-12-13 DIAGNOSIS — Z01.419 ENCOUNTER FOR GYNECOLOGICAL EXAMINATION (GENERAL) (ROUTINE) WITHOUT ABNORMAL FINDINGS: ICD-10-CM

## 2022-01-06 ENCOUNTER — APPOINTMENT (OUTPATIENT)
Dept: OBGYN | Facility: CLINIC | Age: 47
End: 2022-01-06

## 2023-11-03 NOTE — BEGINNING OF VISIT
[Patient] : patient Render In Strict Bullet Format?: No Initiate Treatment: Vtama 1 % topical cream \\nSig: Apply to affected areas once daily Detail Level: Zone

## 2025-07-04 ENCOUNTER — NON-APPOINTMENT (OUTPATIENT)
Age: 50
End: 2025-07-04